# Patient Record
(demographics unavailable — no encounter records)

---

## 2025-06-13 NOTE — OB HISTORY
Problem: Adult Inpatient Plan of Care  Goal: Plan of Care Review  Outcome: Ongoing, Progressing  Goal: Patient-Specific Goal (Individualized)  Outcome: Ongoing, Progressing  Goal: Absence of Hospital-Acquired Illness or Injury  Outcome: Ongoing, Progressing  Goal: Optimal Comfort and Wellbeing  Outcome: Ongoing, Progressing  Goal: Readiness for Transition of Care  Outcome: Ongoing, Progressing     Problem: Impaired Wound Healing  Goal: Optimal Wound Healing  Outcome: Ongoing, Progressing     Problem: Skin Injury Risk Increased  Goal: Skin Health and Integrity  Outcome: Ongoing, Progressing      [Vaginal ___] : [unfilled] vaginal delivery(s)

## 2025-06-23 NOTE — PHYSICAL EXAM
[Chaperoned Physical Exam] : A chaperone was present in the examining room during all aspects of the physical examination. [MA] : MA [No Acute Distress] : in no acute distress [Well developed] : well developed [Oriented x3] : oriented to person, place, and time [Normal Memory] : ~T memory was ~L unimpaired [Warm and Dry] : was warm and dry to touch [Normal Gait] : gait was normal [Labia Majora] : were normal [Labia Minora] : were normal [Normal Appearance] : general appearance was normal [2] : 2 [Normal] : no abnormalities [Post Void Residual ____ml] : post void residual was [unfilled] ml [Exam Deferred] : was deferred [FreeTextEntry2] : Laura [Cough] : no cough [FreeTextEntry3] : negative CST [de-identified] : no POP

## 2025-06-23 NOTE — HISTORY OF PRESENT ILLNESS
[FreeTextEntry1] : 39yo with LINO for the past 12 years and it is getting worse.   PhD in education   PMH: asthma, neck/back injury, thyroid, depression, fibromyalgia  PSH: hemorrhoidectomy  Day time voids: 5 Nighttime voids: 2 EVELYN: daily UUI: 1x/week Urinary urgency: yes Bladder irritants: tea, soda, alcohol, tomatoes, chocolate, citrus, spicy food BM: constipation Fecal Incontinence: no Vaginal bulge: no Prior treatment: no Voiding dysfunction: yes incomplete bladder emptying Lower urinary tract/vaginal symptoms: no UTIs in past year, no hematuria Sexually active: no LMP: 6/8/25 Pap smears: 2025, abnormal (normal f/up) Labs and chart reviewed: yes

## 2025-06-23 NOTE — PROCEDURE
[Straight Catheterization] : insertion of a straight catheter [Urinary Frequency] : urinary frequency [Clear] : clear [No Complications] : no complications [Tolerated Well] : the patient tolerated the procedure well [Culture] : culture [Urinalysis] : urinalysis

## 2025-06-23 NOTE — DISCUSSION/SUMMARY
[FreeTextEntry1] : 41yo with LINO. Neg CST and normal PVR.  1. EVELYN -The patient has symptoms consistent with stress urinary incontinence. The etiology of EVELYN was discussed. Management options including observation, behavioral modifications, pessary, Impressa insert, periurethral bulking via cystoscopy, and surgery with midurethral sling were reviewed. -Will consider her options and call back. Awara of need for UDS prior to procedures/surgeries.   2. OAB -The patient has urinary symptoms consistent with overactive bladder. The etiology of OAB was discussed. Management options including observation, behavioral modifications (dietary changes, monitoring fluid intake, bladder training, timed voids), PFPT, medications, PTNS, SNS, and bladder Botox were all reviewed. -UA/Ucx sent -Will start with bladder diet and behavioral modifications  -Will consider her options and call back. Aware of need for UDS prior to procedures.

## 2025-06-25 NOTE — HISTORY OF PRESENT ILLNESS
[FreeTextEntry1] : 41yo  PMH: asthma, neck/back injury, thyroid, depression PSH: hemorrhoidectomy    Day time voids: *** Nighttime voids: 2 EVELYN: *** UUI: *** Urinary urgency: *** Bladder irritants: *** BM: constipation  Fecal Incontinence: *** Vaginal bulge: ***, ***splinting Prior treatment: ***  Voiding dysfunction: ***incomplete bladder emptying Lower urinary tract/vaginal symptoms: ***UTIs in past year, ***hematuria, ***bladder pain Sexually active: non Pelvic pain: *** LMP: 5/10/25 Pap smears: 2025, abnormal  Labs and chart reviewed:

## 2025-06-25 NOTE — HISTORY OF PRESENT ILLNESS
[FreeTextEntry1] : 39yo  PMH: asthma, neck/back injury, thyroid, depression PSH: hemorrhoidectomy    Day time voids: *** Nighttime voids: 2 EVELYN: *** UUI: *** Urinary urgency: *** Bladder irritants: *** BM: constipation  Fecal Incontinence: *** Vaginal bulge: ***, ***splinting Prior treatment: ***  Voiding dysfunction: ***incomplete bladder emptying Lower urinary tract/vaginal symptoms: ***UTIs in past year, ***hematuria, ***bladder pain Sexually active: non Pelvic pain: *** LMP: 5/10/25 Pap smears: 2025, abnormal  Labs and chart reviewed: